# Patient Record
Sex: MALE | Race: OTHER | NOT HISPANIC OR LATINO | URBAN - METROPOLITAN AREA
[De-identification: names, ages, dates, MRNs, and addresses within clinical notes are randomized per-mention and may not be internally consistent; named-entity substitution may affect disease eponyms.]

---

## 2021-10-19 ENCOUNTER — EMERGENCY (EMERGENCY)
Facility: HOSPITAL | Age: 56
LOS: 1 days | Discharge: HOME | End: 2021-10-19
Attending: EMERGENCY MEDICINE
Payer: MEDICAID

## 2021-10-19 VITALS
HEIGHT: 66 IN | OXYGEN SATURATION: 97 % | TEMPERATURE: 98 F | HEART RATE: 85 BPM | RESPIRATION RATE: 18 BRPM | DIASTOLIC BLOOD PRESSURE: 72 MMHG | SYSTOLIC BLOOD PRESSURE: 117 MMHG | WEIGHT: 160.94 LBS

## 2021-10-19 LAB
ALBUMIN SERPL ELPH-MCNC: 3.3 G/DL — LOW (ref 3.5–5)
ALP SERPL-CCNC: 85 U/L — SIGNIFICANT CHANGE UP (ref 40–120)
ALT FLD-CCNC: 19 U/L DA — SIGNIFICANT CHANGE UP (ref 10–60)
ANION GAP SERPL CALC-SCNC: 5 MMOL/L — SIGNIFICANT CHANGE UP (ref 5–17)
AST SERPL-CCNC: 17 U/L — SIGNIFICANT CHANGE UP (ref 10–40)
BASOPHILS # BLD AUTO: 0.05 K/UL — SIGNIFICANT CHANGE UP (ref 0–0.2)
BASOPHILS NFR BLD AUTO: 0.4 % — SIGNIFICANT CHANGE UP (ref 0–2)
BILIRUB SERPL-MCNC: 0.5 MG/DL — SIGNIFICANT CHANGE UP (ref 0.2–1.2)
BUN SERPL-MCNC: 13 MG/DL — SIGNIFICANT CHANGE UP (ref 7–18)
CALCIUM SERPL-MCNC: 9.4 MG/DL — SIGNIFICANT CHANGE UP (ref 8.4–10.5)
CHLORIDE SERPL-SCNC: 102 MMOL/L — SIGNIFICANT CHANGE UP (ref 96–108)
CO2 SERPL-SCNC: 29 MMOL/L — SIGNIFICANT CHANGE UP (ref 22–31)
CREAT SERPL-MCNC: 0.84 MG/DL — SIGNIFICANT CHANGE UP (ref 0.5–1.3)
EOSINOPHIL # BLD AUTO: 0.06 K/UL — SIGNIFICANT CHANGE UP (ref 0–0.5)
EOSINOPHIL NFR BLD AUTO: 0.5 % — SIGNIFICANT CHANGE UP (ref 0–6)
ERYTHROCYTE [SEDIMENTATION RATE] IN BLOOD: 86 MM/HR — HIGH (ref 0–20)
GLUCOSE SERPL-MCNC: 92 MG/DL — SIGNIFICANT CHANGE UP (ref 70–99)
HCT VFR BLD CALC: 37.2 % — LOW (ref 39–50)
HGB BLD-MCNC: 12.1 G/DL — LOW (ref 13–17)
IMM GRANULOCYTES NFR BLD AUTO: 1 % — SIGNIFICANT CHANGE UP (ref 0–1.5)
LACTATE SERPL-SCNC: 0.6 MMOL/L — LOW (ref 0.7–2)
LYMPHOCYTES # BLD AUTO: 1.86 K/UL — SIGNIFICANT CHANGE UP (ref 1–3.3)
LYMPHOCYTES # BLD AUTO: 16.4 % — SIGNIFICANT CHANGE UP (ref 13–44)
MCHC RBC-ENTMCNC: 28.5 PG — SIGNIFICANT CHANGE UP (ref 27–34)
MCHC RBC-ENTMCNC: 32.5 GM/DL — SIGNIFICANT CHANGE UP (ref 32–36)
MCV RBC AUTO: 87.7 FL — SIGNIFICANT CHANGE UP (ref 80–100)
MONOCYTES # BLD AUTO: 0.9 K/UL — SIGNIFICANT CHANGE UP (ref 0–0.9)
MONOCYTES NFR BLD AUTO: 7.9 % — SIGNIFICANT CHANGE UP (ref 2–14)
NEUTROPHILS # BLD AUTO: 8.36 K/UL — HIGH (ref 1.8–7.4)
NEUTROPHILS NFR BLD AUTO: 73.8 % — SIGNIFICANT CHANGE UP (ref 43–77)
NRBC # BLD: 0 /100 WBCS — SIGNIFICANT CHANGE UP (ref 0–0)
PLATELET # BLD AUTO: 437 K/UL — HIGH (ref 150–400)
POTASSIUM SERPL-MCNC: 3.9 MMOL/L — SIGNIFICANT CHANGE UP (ref 3.5–5.3)
POTASSIUM SERPL-SCNC: 3.9 MMOL/L — SIGNIFICANT CHANGE UP (ref 3.5–5.3)
PROT SERPL-MCNC: 8.2 G/DL — SIGNIFICANT CHANGE UP (ref 6–8.3)
RBC # BLD: 4.24 M/UL — SIGNIFICANT CHANGE UP (ref 4.2–5.8)
RBC # FLD: 14.1 % — SIGNIFICANT CHANGE UP (ref 10.3–14.5)
SODIUM SERPL-SCNC: 136 MMOL/L — SIGNIFICANT CHANGE UP (ref 135–145)
WBC # BLD: 11.34 K/UL — HIGH (ref 3.8–10.5)
WBC # FLD AUTO: 11.34 K/UL — HIGH (ref 3.8–10.5)

## 2021-10-19 PROCEDURE — 85652 RBC SED RATE AUTOMATED: CPT

## 2021-10-19 PROCEDURE — 73110 X-RAY EXAM OF WRIST: CPT | Mod: 26,LT

## 2021-10-19 PROCEDURE — 99284 EMERGENCY DEPT VISIT MOD MDM: CPT

## 2021-10-19 PROCEDURE — 96374 THER/PROPH/DIAG INJ IV PUSH: CPT

## 2021-10-19 PROCEDURE — 83605 ASSAY OF LACTIC ACID: CPT

## 2021-10-19 PROCEDURE — 85025 COMPLETE CBC W/AUTO DIFF WBC: CPT

## 2021-10-19 PROCEDURE — 36415 COLL VENOUS BLD VENIPUNCTURE: CPT

## 2021-10-19 PROCEDURE — 99284 EMERGENCY DEPT VISIT MOD MDM: CPT | Mod: 25

## 2021-10-19 PROCEDURE — 73110 X-RAY EXAM OF WRIST: CPT

## 2021-10-19 PROCEDURE — 84550 ASSAY OF BLOOD/URIC ACID: CPT

## 2021-10-19 PROCEDURE — 80053 COMPREHEN METABOLIC PANEL: CPT

## 2021-10-19 RX ORDER — OXYCODONE AND ACETAMINOPHEN 5; 325 MG/1; MG/1
1 TABLET ORAL ONCE
Refills: 0 | Status: DISCONTINUED | OUTPATIENT
Start: 2021-10-19 | End: 2021-10-19

## 2021-10-19 RX ORDER — KETOROLAC TROMETHAMINE 30 MG/ML
15 SYRINGE (ML) INJECTION ONCE
Refills: 0 | Status: DISCONTINUED | OUTPATIENT
Start: 2021-10-19 | End: 2021-10-19

## 2021-10-19 RX ADMIN — Medication 15 MILLIGRAM(S): at 15:30

## 2021-10-19 RX ADMIN — OXYCODONE AND ACETAMINOPHEN 1 TABLET(S): 5; 325 TABLET ORAL at 15:30

## 2021-10-19 NOTE — CONSULT NOTE ADULT - SUBJECTIVE AND OBJECTIVE BOX
Pt Name: STEPHANIE DE LA CRUZ  MRN: [id number]    ORTHOPEDIC CONSULT:    Orthopedic diagnosis:    HPI: Patient is a 56y Male presenting with left wrist pain for 2 days with no precipitating factors such as trauma, falls, bug bites. Pain has gradually increased the past 2 days with redness and swelling. No radiation of pain, alleviated with rest and meds. Denies any CP, SOB, paresthesias, fever, chills, N/V/D.       PAST MEDICAL & SURGICAL HISTORY:      ALLERGIES: No Known Allergies      MEDICATIONS:     PHYSICAL EXAM:    Vital Signs Last 24 Hrs  T(C): 36.6 (19 Oct 2021 14:32), Max: 36.6 (19 Oct 2021 14:32)  T(F): 97.8 (19 Oct 2021 14:32), Max: 97.8 (19 Oct 2021 14:32)  HR: 85 (19 Oct 2021 14:32) (85 - 85)  BP: 117/72 (19 Oct 2021 14:32) (117/72 - 117/72)  BP(mean): --  RR: 18 (19 Oct 2021 14:32) (18 - 18)  SpO2: 97% (19 Oct 2021 14:32) (97% - 97%)    Left wrist: Localized erythema [id number] over ulnar styloid area. Pain over ulnar styloid. No joint effusion palpated. Skin intact. Wrist Extension to 80 degrees and flexion to 80 degrees with mild to moderate pain. Pain with radial deviation. No pain with ulnar deviation. Sensation intact. 5/5  strength.     LABS:                        12.1   11.34 )-----------( 437      ( 19 Oct 2021 15:41 )             37.2     10-19    136  |  102  |  13  ----------------------------<  92  3.9   |  29  |  0.84    Ca    9.4      19 Oct 2021 15:41    TPro  8.2  /  Alb  3.3<L>  /  TBili  0.5  /  DBili  x   /  AST  17  /  ALT  19  /  AlkPhos  85  10-19        RADIOLOGY:   Xray of Left Wrist: Preliminary shows no fracture or dislocation    IMPRESSION: Pt is a  56y Male with Left wrist tendonosis    PLAN:  -  Wrist splint  -  NSAIDs pain management  -  Likely tendonosis/inflammatory process. Low suspicion of septic joint at left wrist  -  Ice and elevation  -  Follow up with Dr. Martin within 1-2 weeks

## 2021-10-19 NOTE — ED PROVIDER NOTE - CLINICAL SUMMARY MEDICAL DECISION MAKING FREE TEXT BOX
56 year old male presenting with nontraumatic left wrist swelling and pain. Will obtain X-ray to rule out a fracture, check labs, give pain medication, consult orthopedist, and reassess patient.

## 2021-10-19 NOTE — ED PROVIDER NOTE - PROGRESS NOTE DETAILS
Patient feels much better. Improved rom. Pain with ulnar deviation of the wrist. Swelling improved with cold compress. XR negative. Uric acid WNL. ESR 86. Ortho consulted and evaluated patient in the ER. As per ortho presentation most likely tendinitis. Will give Rx for IBU, wrist splint, cold compress and ortho/pmd follow up within 1 week. Pt is well appearing walking with steady gait, stable for discharge and follow up without fail with medical doctor. I had a detailed discussion with the patient and/or guardian regarding the historical points, exam findings, and any diagnostic results supporting the discharge diagnosis. Pt educated on care and need for follow up. Strict return instructions and red flag signs and symptoms discussed with patient. Questions answered. Pt shows understanding of discharge information and agrees to follow.

## 2021-10-19 NOTE — ED ADULT NURSE NOTE - CAS DISCH ACCOMP BY
Self Topical Clindamycin Counseling: Patient counseled that this medication may cause skin irritation or allergic reactions.  In the event of skin irritation, the patient was advised to reduce the amount of the drug applied or use it less frequently.   The patient verbalized understanding of the proper use and possible adverse effects of clindamycin.  All of the patient's questions and concerns were addressed.

## 2021-10-19 NOTE — ED PROVIDER NOTE - NSFOLLOWUPINSTRUCTIONS_ED_ALL_ED_FT
Follow up with the primary care doctor within 1-2 days.  Follow up with the orthopedist within 3-4 days.  Cold compress to the area 3-4 times per day.  Elevate the wrist to help with swelling.  If you experience any new or worsening symptoms or if you are concerned you can always come back to the emergency for a re-evaluation.  If there were any prescriptions given to you during the visit today take them as prescribed. If you have any questions you can ask the pharmacist.

## 2021-10-19 NOTE — ED PROVIDER NOTE - CHPI ED SYMPTOMS NEG
no chills, other joint swelling, urinary symptoms, vision problems, numbness, tingling, focal weakness/no fever

## 2021-10-19 NOTE — ED PROVIDER NOTE - ATTENDING CONTRIBUTION TO CARE
Agree with NP H&P.  56 year old male presenting with left wrist swelling and pain. Pt denies trauma.  Will check X-ray, check labs, give pain medication, and reassess patient.   Ortho to be admitted.

## 2021-10-19 NOTE — ED PROVIDER NOTE - OBJECTIVE STATEMENT
56 year old male with no significant PMHx or PSHx presents to the ED with complaints of left wrist swelling and pain x2 days. Patient reports experiencing gradual onset of progressively worsening sharp, localized pain to the wrist which is worse with the slightest movement. Patient additionally reports that the joint is warm. Patient denies any fevers, chills, other joint swelling, urinary symptoms, vision problems, numbness, tingling, focal weakness, and all other acute complaints. Patient denies having any history of gout in the past. NKDA.

## 2021-10-19 NOTE — ED PROVIDER NOTE - PATIENT PORTAL LINK FT
You can access the FollowMyHealth Patient Portal offered by [facility] by registering at the following website: [email or web address]. By joining [facility]’s FollowMyHealth portal, you will also be able to view your health information using other applications (apps) compatible with our system.

## 2021-10-19 NOTE — ED PROVIDER NOTE - PHYSICAL EXAMINATION
Left wrist very limited range of motion.   Mild erythema near the ulnar styloid.  No induration.  Joint is warm.   Unable to perform passive range of motion secondary to pain.   Capillary refill <2 seconds in all fingers.  All fingers with full range of motion.   Right elbow with full range of motion without swelling or tenderness.   No streaking.  Radial and ulnar pulses intact 2+.

## 2022-04-28 NOTE — ED ADULT NURSE NOTE - NSSUHOSCREENINGYN_ED_ALL_ED
Yes - the patient is able to be screened +tooth eruption at right upper and lower wisdom, no gingival swelling or erythremia, +teeth well seated and mildly tender to percussion